# Patient Record
Sex: FEMALE | Race: WHITE | NOT HISPANIC OR LATINO | URBAN - NONMETROPOLITAN AREA
[De-identification: names, ages, dates, MRNs, and addresses within clinical notes are randomized per-mention and may not be internally consistent; named-entity substitution may affect disease eponyms.]

---

## 2020-06-25 ENCOUNTER — IMPORTED ENCOUNTER (OUTPATIENT)
Dept: URBAN - NONMETROPOLITAN AREA CLINIC 1 | Facility: CLINIC | Age: 75
End: 2020-06-25

## 2020-06-25 PROBLEM — H25.12: Noted: 2020-06-25

## 2020-06-25 PROBLEM — H21.1X2: Noted: 2020-06-25

## 2020-06-25 PROBLEM — Z96.1: Noted: 2020-06-25

## 2020-06-25 PROCEDURE — 92002 INTRM OPH EXAM NEW PATIENT: CPT

## 2020-06-25 NOTE — PATIENT DISCUSSION
ALMA John 84 ON 3 DAYS TO SEE THEIR OWN EYE DOCTOR FOR A REFERRAL TO [facility] FOR TXPT STATED SHE HAS A HX OF LASER TX ON OS

## 2020-07-27 ENCOUNTER — IMPORTED ENCOUNTER (OUTPATIENT)
Dept: URBAN - NONMETROPOLITAN AREA CLINIC 1 | Facility: CLINIC | Age: 75
End: 2020-07-27

## 2020-07-27 PROCEDURE — 92014 COMPRE OPH EXAM EST PT 1/>: CPT

## 2020-07-27 NOTE — PATIENT DISCUSSION
Felix E [address] OSEDUCATE PTTx with specialist in [place]/p laser and injections per ptPT Felix Mg [address] TO DR[address] WANG AND NIRALI FOR TXpseudophakia ODSTABLE TODAYCataract OS WITH IRIS SYNC-Not yet surgical. -Reviewed symptoms of advancing cataract growth such as glare and halos and decreased vision.-Continue to monitor for now. Pt will notify us if any new symptoms develop. POSSIBLE VITREAL CELLS ODSTART PF Q2H OD

## 2022-04-09 ASSESSMENT — TONOMETRY
OD_IOP_MMHG: 16
OS_IOP_MMHG: 19
OD_IOP_MMHG: 14
OS_IOP_MMHG: 25

## 2022-04-09 ASSESSMENT — VISUAL ACUITY
OS_CC: 20/80
OD_SC: 20/60
OS_SC: 20/80
OD_CC: 20/50